# Patient Record
(demographics unavailable — no encounter records)

---

## 2025-01-30 NOTE — PHYSICAL EXAM
[Alert] : alert [Well Nourished] : well nourished [Healthy Appearance] : healthy appearance [No Acute Distress] : no acute distress [Well Developed] : well developed [Normal Sclera/Conjunctiva] : normal sclera/conjunctiva [EOMI] : extra ocular movement intact [No Proptosis] : no proptosis [Normal Oropharynx] : the oropharynx was normal [Supple] : the neck was supple [Thyroid Not Enlarged] : the thyroid was not enlarged [No Thyroid Nodules] : no palpable thyroid nodules [No Respiratory Distress] : no respiratory distress [No Accessory Muscle Use] : no accessory muscle use [Clear to Auscultation] : lungs were clear to auscultation bilaterally [Normal S1, S2] : normal S1 and S2 [Normal Rate] : heart rate was normal [Regular Rhythm] : with a regular rhythm [No Edema] : no peripheral edema [Pedal Pulses Normal] : the pedal pulses are present [Normal Bowel Sounds] : normal bowel sounds [Not Tender] : non-tender [Not Distended] : not distended [Soft] : abdomen soft [No HSM] : no hepato-splenomegaly [Normal Supraclavicular Nodes] : no supraclavicular lymphadenopathy [Normal Anterior Cervical Nodes] : no anterior cervical lymphadenopathy [Normal Posterior Cervical Nodes] : no posterior cervical lymphadenopathy [No Spinal Tenderness] : no spinal tenderness [Spine Straight] : spine straight [No Stigmata of Cushings Syndrome] : no stigmata of Cushings Syndrome [Normal Gait] : normal gait [Normal Strength/Tone] : muscle strength and tone were normal [No Rash] : no rash [No Skin Lesions] : no skin lesions [Acanthosis Nigricans] : no acanthosis nigricans [Foot Ulcers] : no foot ulcers [No Motor Deficits] : the motor exam was normal [No Sensory Deficits] : the sensory exam was normal to light touch and pinprick [Normal Reflexes] : deep tendon reflexes were 2+ and symmetric [No Tremors] : no tremors [Oriented x3] : oriented to person, place, and time [de-identified] : Patient BMI is at 25.66 [de-identified] : Distal pulses are 1+ bilaterally over the dorsalis pedis [de-identified] : Diffuse joint pains with morning stiffness [de-identified] : Recent surgery on the nose for basal cell cancer

## 2025-01-30 NOTE — REVIEW OF SYSTEMS
[Fatigue] : no fatigue [Decreased Appetite] : appetite not decreased [Recent Weight Gain (___ Lbs)] : recent weight gain: [unfilled] lbs [Pain/Numbness of Digits] : pain/numbness of digits [Negative] : Heme/Lymph

## 2025-01-30 NOTE — ASSESSMENT
[Diabetes Foot Care] : diabetes foot care [Long Term Vascular Complications] : long term vascular complications of diabetes [Carbohydrate Consistent Diet] : carbohydrate consistent diet [Importance of Diet and Exercise] : importance of diet and exercise to improve glycemic control, achieve weight loss and improve cardiovascular health [Exercise/Effect on Glucose] : exercise/effect on glucose [Hypoglycemia Management] : hypoglycemia management [Self Monitoring of Blood Glucose] : self monitoring of blood glucose [Retinopathy Screening] : Patient was referred to ophthalmology for retinopathy screening [FreeTextEntry1] : Middle-age white female who has a past medical history of insulin-dependent diabetes, mild neuropathy, disease presents for routine follow-up.  Patient  with  hemoglobin A1c,6.8, TSH level is normal at 1.43, albumin /cr ratio is 20, complete metabolic panel is normal with a BUN of 22 creatinine of 0.7.   Patient is clinically stable with good glycemic control.  Patient is very motivated to maintain normal blood sugars.  She does get occasional spikes in her blood glucose levels especially after her breakfast.  Recommendation 1.  I have advised the patient to increase the NovoLog prior to the breakfast from 10 to 12 units and to monitor her sugar levels very closely and to avoid hypoglycemic episodes. 2.  Patient will continue with 15 units of the insulin NovoLog prior to lunch and breakfast and also insulin Lantus 35 units at night. 3.  Patient will continue with the levothyroxine 112 mcg tablets 6 days a week and half a tablet on Sunday. 4.  Patient will continue with all the other medications which include Plavix, PERRL, rosuvastatin 40 mg daily Zetia 10 mg daily carvedilol, Lexapro and furosemide 3 times a week. 5.  If clinically stable the patient will return to the office in approximately 3 to 4 months with a repeat blood analysis.  The plan was discussed with the patient in detail thank you

## 2025-02-24 NOTE — PHYSICAL EXAM
[FreeTextEntry1] : GENERAL APPEARANCE: Well developed, well nourished woman in no acute distress. CARDIOVASCULAR: Regular rate and rhythm   NEUROLOGIC EXAM: MENTAL STATUS: Alert and Oriented to person, place and time. Speech is fluent, without aphasia or dysarthria Behavior and affect appropriate to situation.                         CRANIAL NERVES: CN 2:    Visual fields are full to confrontation. CN 3, 4, 6: Extraocular movements are intact. No nystagmus or ophthalmoplegia is evident. Pupils are equally round and reactive to light. CN 5:     Facial sensation is intact to light touch in all 3 divisions CN 7:     Facial excursion is full and symmetric bilaterally. MOTOR: Strength is 5/5 throughout for age and stature. No orbiting or drift noted. SENSORY: Intact to light touch perception in all four extremities without extinction to double simultaneous stimulation COORD: Finger to nose testing without dysmetria bilaterally. GAIT: Normal station and gait.

## 2025-02-24 NOTE — DISCUSSION/SUMMARY
[FreeTextEntry1] : 74 year old female with DM1 (since age 6 years), HTN, HLD, CAD s/p CABG on DAPT admitted to [facility] 2/10/2025 with dizziness upon standing and severe hypertension found to have punctate DWI positive lesion in the left basal ganglia concerning for possible acute ischemic stroke.   I have personally reviewed available neuroradiological images. CTA head and neck - no severe cervical and intracranial stenosis. Mild athero.  MRI head - punctate left thalamocapsular DWI restriction with ADC reduction. No hemorrhage. No mass.  Cholesterol: 86 HDL Chol: 36 LLDL: 31 HgbA1C: 6.9   Etiology of stroke is unknown, but small vessel disease is possible. She has extensive coronary history (first heart attack 1992 managed medically, CABG 2022, x3 coronary stents 1-2 years ago) and long term cardiac monitoring with ILR is reasonable if Zio patch findings are unrevealing for Afib.    I have counseled the patient in regards to potentially fatal or disabling intracranial or gastrointestinal hemorrhage with anticoagulation.  I have also advised him about the potential further increased risk of significant hemorrhage with the addition of antiplatelet therapy while anticoagulated.  Patient was educated about signs and symptoms of stroke and was counseled to contact 911 upon emergence of stroke-like symptoms. Intravenous thrombolysis and mechanical thrombectomy was also counseled and educated to the patient today.  RECOMMENDATION: 1. Xarelto is reasonable for now, if ILR placed, then antiplatelet therapy is reasonable.  2. Aspirin as per cardiology. 3. continue rosuvastatin 40mg daily 4. Normotension.  5. Follow up Zio results, if afib, then continue Xarelto. If no afib, then ILR is reasonable.    Follow up 3 months in [place].

## 2025-02-24 NOTE — DISCUSSION/SUMMARY
[FreeTextEntry1] : 74 year old female with DM1 (since age 6 years), HTN, HLD, CAD s/p CABG on DAPT admitted to [facility] 2/10/2025 with dizziness upon standing and severe hypertension found to have punctate DWI positive lesion in the left basal ganglia concerning for possible acute ischemic stroke.   I have personally reviewed available neuroradiological images. CTA head and neck - no severe cervical and intracranial stenosis. Mild athero.  MRI head - punctate left thalamocapsular DWI restriction with ADC reduction. No hemorrhage. No mass.  Cholesterol: 86 HDL Chol: 36 LLDL: 31 HgbA1C: 6.9   Etiology of stroke is unknown, but small vessel disease is possible. She has extensive coronary history (first heart attack 1992 managed medically, CABG 2022, x3 coronary stents 1-2 years ago) and long term cardiac monitoring with ILR is reasonable if Zio patch findings are unrevealing for Afib.    I have counseled the patient in regards to potentially fatal or disabling intracranial or gastrointestinal hemorrhage with anticoagulation.  I have also advised him about the potential further increased risk of significant hemorrhage with the addition of antiplatelet therapy while anticoagulated.  Patient was educated about signs and symptoms of stroke and was counseled to contact 911 upon emergence of stroke-like symptoms. Intravenous thrombolysis and mechanical thrombectomy was also counseled and educated to the patient today.  RECOMMENDATION: 1. Xarelto is reasonable for now, if ILR placed, then antiplatelet therapy is reasonable.  2. Aspirin as per cardiology. 3. continue rosuvastatin 40mg daily 4. Normotension.  5. Follow up Zio results, if afib, then continue Xarelto. If no afib, then ILR is reasonable.    Follow up 3 months in [place].

## 2025-02-24 NOTE — HISTORY OF PRESENT ILLNESS
[FreeTextEntry1] : Since hospitalization she is doing well. She has not had any significant recurrent vertigo. She completed 10 days of zio patch but got significant rash.  She is pending results.  In the meantime, her cardiologist has switched her from clopidogrel to Xarelto 20mg daily pending result of Zio patch. She has remained on aspirin and is tolerating both well.  She admits some palpitations at times.  Her BP at home has been fairly controlled, but much improved from hospitalization. She has no significant residual deficits.   The patient is functionally independent in all of their basic activities of daily living without issues. She does not drive.

## 2025-05-28 NOTE — HISTORY OF PRESENT ILLNESS
[FreeTextEntry1] : Since hospitalization she is doing well. She completed 10 days of zio patch but got significant rash, patient is now on ILR since March 2025.  Patient is on ASA 81 and Xarelto, no significant bleeding, minor bruising. Otherwise tolerating well.   Her BP at home has been fairly controlled, she states it usually averages at 140 systolic.   The patient is functionally independent in all of their basic activities of daily living without issues. She does not drive.   Patient states she getting fleeting moment of shooting pain in her head that lasts approximately a few seconds. Does not warrant any Advil or Tylenol due to how fast it comes and goes.   Intermittent 1st, 2nd, 3rd digit numbness R>L. She states it primarily occurs after she wakes up in the morning.   Denies vision disturbance, hearing disturbance, HA,  speech changes, balance disturbance Denies neck pain, back pain, difficulty walking, recent fall

## 2025-05-28 NOTE — PHYSICAL EXAM
[FreeTextEntry1] : GENERAL APPEARANCE: Well developed, well nourished woman in no acute distress. CARDIOVASCULAR: Regular rate and rhythm   NEUROLOGIC EXAM: MENTAL STATUS: Alert and Oriented to person, place and time. Speech is fluent, without aphasia or dysarthria Behavior and affect appropriate to situation.                         CRANIAL NERVES: CN 2:    Visual fields are full to confrontation. CN 3, 4, 6: Extraocular movements are intact. No nystagmus or ophthalmoplegia is evident. Pupils are equally round and reactive to light. CN 5:     Facial sensation is intact to light touch in all 3 divisions CN 7:     Facial excursion is full and symmetric bilaterally. MOTOR: Strength is 5/5 throughout for age and stature. SENSORY: Intact to light touch perception in all four extremities without extinction to double simultaneous stimulation COORD: Finger to nose testing without dysmetria bilaterally. GAIT: Normal station and gait.   positive tinels test on right hand, +Phalen sign bilateral (worse on right). Decreased strength right APB.

## 2025-05-28 NOTE — DISCUSSION/SUMMARY
[FreeTextEntry1] : 75 year old female with DM1 (since age 6 years), HTN, HLD, CAD s/p CABG on DAPT admitted to [facility] 2/10/2025 with dizziness upon standing and severe hypertension found to have punctate DWI positive lesion in the left basal ganglia concerning for possible acute ischemic stroke and carpal tunnel syndrome, bilateral, worse on right than left.   CTA head and neck - no severe cervical and intracranial stenosis. Mild athero.  MRI head - punctate left thalamocapsular DWI restriction with ADC reduction. No hemorrhage. No mass.  Cholesterol: 86 HDL Chol: 36 LLDL: 31 HgbA1C: 6.9   Etiology of stroke is unknown, but small vessel disease is possible. She has extensive coronary history (first heart attack 1992 managed medically, CABG 2022, x3 coronary stents 1-2 years ago), zio patch with no significant events of A fib per patient, however had ILR placed in March 2025.  I have counseled the patient in regards to potentially fatal or disabling intracranial or gastrointestinal hemorrhage with anticoagulation.  I have also advised him about the potential further increased risk of significant hemorrhage with the addition of antiplatelet therapy while anticoagulated.  Patient was educated about signs and symptoms of stroke and was counseled to contact 911 upon emergence of stroke-like symptoms. Intravenous thrombolysis and mechanical thrombectomy was also counseled and educated to the patient today.  Carpal tunnel - mild to moderate on right, mild on left. For now wrist orthotics. If worsening then EMG/NCV and possible carpal tunnel release.   RECOMMENDATION: 1. Continue with Xarelto 2. Aspirin as per cardiology. 3. continue rosuvastatin 40mg daily 4. Normotension.  5. Continue with cardiology for ILR monitoring  6. Would recommend wrist orthotics for carpal tunnel during sleep. -Will monitor, consider with EMG testing, patient is declining at this time and will try wrist brace. 7. Carotid US with [facility] vascular   Follow up 4 months in [place].

## 2025-06-13 NOTE — PHYSICAL EXAM
[Alert] : alert [Well Nourished] : well nourished [No Acute Distress] : no acute distress [Well Developed] : well developed [Normal Sclera/Conjunctiva] : normal sclera/conjunctiva [EOMI] : extra ocular movement intact [No Proptosis] : no proptosis [Normal Outer Ear/Nose] : the ears and nose were normal in appearance [Normal Hearing] : hearing was normal [Normal Oropharynx] : the oropharynx was normal [No Neck Mass] : no neck mass was observed [Thyroid Not Enlarged] : the thyroid was not enlarged [No Thyroid Nodules] : no palpable thyroid nodules [No Respiratory Distress] : no respiratory distress [No Accessory Muscle Use] : no accessory muscle use [Clear to Auscultation] : lungs were clear to auscultation bilaterally [Normal S1, S2] : normal S1 and S2 [Normal Rate] : heart rate was normal [Regular Rhythm] : with a regular rhythm [Carotids Normal] : carotid pulses were normal with no bruits [No Edema] : no peripheral edema [Pedal Pulses Normal] : the pedal pulses are present [Normal Bowel Sounds] : normal bowel sounds [Not Tender] : non-tender [Not Distended] : not distended [Soft] : abdomen soft [No HSM] : no hepato-splenomegaly [Normal Supraclavicular Nodes] : no supraclavicular lymphadenopathy [Normal Anterior Cervical Nodes] : no anterior cervical lymphadenopathy [Normal Posterior Cervical Nodes] : no posterior cervical lymphadenopathy [No CVA Tenderness] : no ~M costovertebral angle tenderness [No Spinal Tenderness] : no spinal tenderness [Spine Straight] : spine straight [No Stigmata of Cushings Syndrome] : no stigmata of Cushings Syndrome [Normal Gait] : normal gait [Normal Strength/Tone] : muscle strength and tone were normal [No Rash] : no rash [Acanthosis Nigricans] : no acanthosis nigricans [Foot Ulcers] : no foot ulcers [No Motor Deficits] : the motor exam was normal [No Sensory Deficits] : the sensory exam was normal to light touch and pinprick [Normal Reflexes] : deep tendon reflexes were 2+ and symmetric [No Tremors] : no tremors [Oriented x3] : oriented to person, place, and time [de-identified] : Patient's BMI is at 20.82 her weight is 160 pounds [de-identified] : Soft systolic murmur over the apex [de-identified] : Slightly dried blood over the distal part of the left first toe

## 2025-06-13 NOTE — REVIEW OF SYSTEMS
[Fatigue] : no fatigue [Decreased Appetite] : appetite not decreased [Fever] : no fever [Chills] : no chills [Dysphagia] : no dysphagia [Neck Pain] : no neck pain [Dysphonia] : no dysphonia [Chest Pain] : no chest pain [Leg Claudication] : no leg claudication [Palpitations] : palpitations [Lower Ext Edema] : no lower extremity edema [Nausea] : no nausea [Constipation] : no constipation [Abdominal Pain] : no abdominal pain [Vomiting] : no vomiting [Polyuria] : no polyuria [Dysuria] : no dysuria [Nocturia] : nocturia [Headaches] : no headaches [Dizziness] : no dizziness [Tremors] : no tremors [Pain/Numbness of Digits] : no pain/numbness of digits [Anxiety] : anxiety [Cold Intolerance] : no cold intolerance [Negative] : Heme/Lymph

## 2025-06-13 NOTE — HISTORY OF PRESENT ILLNESS
[FreeTextEntry1] : 75-year-old female with a medical hx of DM1 present for a routine follow up exam. Patient is compliant with taking her medications of Ezetimibe, Levothyroxine, Rosuvastatin, Lantus solostar insulin, ramipril. Patient is currently wearing the Dexcom G7 sensor to monitor her glucose levels which shows that she is in range 96% of the time with no hypoglycemic episodes. Her most recent blood work 6/4/25 shows an A1C 6.4, glucose level of 105.  Patient claimed that she is compliant with the diet and she walks on a daily basis.  She denies any symptoms of hypoglycemia and her vision has been stable.  There is very minimal numbness of the lower extremities.  Patient also has a history of cardiac arrhythmia and atrial fibrillation and has an implanted loop recorder.  Her current medications include a baby aspirin, carvedilol, citalopram, as, ramipril 2.5 mg daily, rosuvastatin 40 mg daily, Xarelto and levothyroxine 112 mcg daily

## 2025-06-13 NOTE — ASSESSMENT
[Diabetes Foot Care] : diabetes foot care [Long Term Vascular Complications] : long term vascular complications of diabetes [Carbohydrate Consistent Diet] : carbohydrate consistent diet [Importance of Diet and Exercise] : importance of diet and exercise to improve glycemic control, achieve weight loss and improve cardiovascular health [Exercise/Effect on Glucose] : exercise/effect on glucose [Hypoglycemia Management] : hypoglycemia management [Self Monitoring of Blood Glucose] : self monitoring of blood glucose [Retinopathy Screening] : Patient was referred to ophthalmology for retinopathy screening [FreeTextEntry1] : A pleasant middle-aged white female who has a past medical history of insulin-dependent diabetes clearly on insulin Lantus 35 units in the morning and in NovoLog 8 to 15 units before meals.  Patient is also wearing a continuous glucose monitor the Dexcom G7 which shows most of the time and rarely if the patient experiences hypoglycemia.  Patient is well compliant with the diet and exercise regimen is very motivated to maintain normal control of her diabetes patient recently had a blood test which showed that the hemoglobin A1c is 6 point percent, complete metabolic panel is normal with a glucose of 105 mg per DL, TSH level is 0.40.  Recommendation 1.  Patient is advised to continue with her current insulin regimen which is insulin Lantus 35 units daily at night and NovoLog insulin 8 to 15 units 3 times a day before meals. 2.  Patient is to continue monitoring  sugar levels via using the glucose monitoring system which has been very helpful to the patient in adjusting her insulin doses. 3.  The importance of a strict low carbohydrate and low-fat diet was discussed with the patient together with regular cardiovascular exercise. 4.  If the patient's condition remains stable then she will follow-up in approximately 4 months time with a repeat blood analysis.  The plan was discussed in detail with the patient thank you

## 2025-07-09 NOTE — END OF VISIT
[Time Spent: ___ minutes] : I have spent [unfilled] minutes of time on the encounter which excludes teaching and separately reported services. dependent (less than 25% patients effort) dependent (less than 25% patients effort) maximum assist (25% patients effort)